# Patient Record
Sex: FEMALE | Race: BLACK OR AFRICAN AMERICAN | NOT HISPANIC OR LATINO | Employment: UNEMPLOYED | URBAN - METROPOLITAN AREA
[De-identification: names, ages, dates, MRNs, and addresses within clinical notes are randomized per-mention and may not be internally consistent; named-entity substitution may affect disease eponyms.]

---
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
## 2021-04-01 PROBLEM — O47.9 BRAXTON HICK'S CONTRACTION: Status: ACTIVE | Noted: 2021-04-01

## 2021-04-10 PROBLEM — O99.820 GBS (GROUP B STREPTOCOCCUS CARRIER), +RV CULTURE, CURRENTLY PREGNANT: Status: ACTIVE | Noted: 2021-04-10

## 2021-04-28 PROBLEM — O47.9 BRAXTON HICK'S CONTRACTION: Status: RESOLVED | Noted: 2021-04-01 | Resolved: 2021-04-28

## 2021-04-28 PROBLEM — Z28.21 INFLUENZA VACCINATION DECLINED BY PATIENT: Status: RESOLVED | Noted: 2020-10-13 | Resolved: 2021-04-28

## 2021-04-28 PROBLEM — O47.9 UTERINE CONTRACTIONS DURING PREGNANCY: Status: ACTIVE | Noted: 2021-04-28

## 2021-04-29 PROBLEM — Z98.891 S/P EMERGENCY CESAREAN SECTION: Status: ACTIVE | Noted: 2021-04-29

## 2021-04-30 PROBLEM — O99.820 GBS (GROUP B STREPTOCOCCUS CARRIER), +RV CULTURE, CURRENTLY PREGNANT: Status: RESOLVED | Noted: 2021-04-10 | Resolved: 2021-04-30

## 2021-05-02 PROBLEM — D62 ACUTE BLOOD LOSS ANEMIA: Status: ACTIVE | Noted: 2021-05-02

## 2021-05-04 PROBLEM — G89.18 PAIN FOLLOWING CESAREAN DELIVERY: Status: ACTIVE | Noted: 2021-05-04

## 2021-05-04 PROBLEM — O92.79 POSTPARTUM BREAST ENGORGEMENT: Status: ACTIVE | Noted: 2021-05-04

## 2021-05-07 PROBLEM — R03.0 BLOOD PRESSURE ELEVATED WITHOUT HISTORY OF HTN: Status: ACTIVE | Noted: 2021-05-07

## 2021-05-09 PROBLEM — Z53.29 LEFT AGAINST MEDICAL ADVICE: Status: ACTIVE | Noted: 2021-05-09

## 2021-08-09 PROBLEM — G89.18 PAIN FOLLOWING CESAREAN DELIVERY: Status: RESOLVED | Noted: 2021-05-04 | Resolved: 2021-08-09

## 2023-06-27 ENCOUNTER — PATIENT MESSAGE (OUTPATIENT)
Dept: RESEARCH | Facility: HOSPITAL | Age: 23
End: 2023-06-27